# Patient Record
Sex: FEMALE | Race: WHITE | Employment: FULL TIME | URBAN - METROPOLITAN AREA
[De-identification: names, ages, dates, MRNs, and addresses within clinical notes are randomized per-mention and may not be internally consistent; named-entity substitution may affect disease eponyms.]

---

## 2017-07-01 ENCOUNTER — OFFICE VISIT (OUTPATIENT)
Dept: FAMILY MEDICINE CLINIC | Facility: CLINIC | Age: 22
End: 2017-07-01

## 2017-07-01 VITALS
TEMPERATURE: 98 F | SYSTOLIC BLOOD PRESSURE: 120 MMHG | WEIGHT: 192 LBS | HEIGHT: 66 IN | BODY MASS INDEX: 30.86 KG/M2 | DIASTOLIC BLOOD PRESSURE: 70 MMHG

## 2017-07-01 DIAGNOSIS — R30.0 DYSURIA: Primary | ICD-10-CM

## 2017-07-01 LAB
APPEARANCE: CLEAR
MULTISTIX LOT#: ABNORMAL NUMERIC
PH, URINE: 7 (ref 4.5–8)
PROTEIN (URINE DIPSTICK): 100 MG/DL
SPECIFIC GRAVITY: >=1.03 (ref 1–1.03)
URINE-COLOR: YELLOW
UROBILINOGEN,SEMI-QN: 0.2 MG/DL (ref 0–1.9)

## 2017-07-01 PROCEDURE — 99203 OFFICE O/P NEW LOW 30 MIN: CPT | Performed by: PHYSICIAN ASSISTANT

## 2017-07-01 PROCEDURE — 87086 URINE CULTURE/COLONY COUNT: CPT | Performed by: PHYSICIAN ASSISTANT

## 2017-07-01 PROCEDURE — 81003 URINALYSIS AUTO W/O SCOPE: CPT | Performed by: PHYSICIAN ASSISTANT

## 2017-07-01 RX ORDER — NITROFURANTOIN 25; 75 MG/1; MG/1
100 CAPSULE ORAL 2 TIMES DAILY
Qty: 10 CAPSULE | Refills: 0 | Status: SHIPPED | OUTPATIENT
Start: 2017-07-01 | End: 2017-07-06

## 2017-07-01 NOTE — PROGRESS NOTES
CHIEF COMPLAINT:   Patient presents with:  Dysuria: x 4 days, LMP x 1 day. HPI:   Gilmar Castellanos is a 22year old female who presents with symptoms of UTI. Complaining of urinary frequency, urgency, dysuria for last 4 days.  Symptoms have been w APPEARANCE clear Clear   URINE-COLOR yellow Yellow   Multistix Lot# [id number] Numeric   Multistix Expiration Date 02/28/18 Date         ASSESSMENT AND PLAN:   Robynn Peabody is a 22year old female presents with UTI symptoms.     ASSESSMENT:  Dysuria  (pr Soaps, lotions, colognes and feminine hygiene products can cause dysuria. So can birth control jellies, creams, and foams. It will go away 1 to 3 days after using these irritants.   Sexually transmitted diseases (STDs) such as chlamydia or gonorrhea can cau Follow up with your healthcare provider, or as advised. If a culture was taken, you may call as directed for the results. If you have an STD, follow up with your provider or the public health department for a complete STD screening, including HIV testing.

## 2017-07-01 NOTE — PATIENT INSTRUCTIONS
1. Urinalysis today with trace blood, otherwise normal.  Culture pending, we will contact you with results in 2-3 days. 2.  Macrobid 100 mg twice daily for 5 days. 3.  Encourage clear fluids, avoid caffeine, vaginal hygiene reviewed.        Dysuria wit · If a culture was taken, don't have sex until you have been told that it is negative. This means you don't have an infection. Then follow your healthcare provider's advice to treat your condition.   If a culture was done and it is positive:  · Both you and

## 2017-07-04 ENCOUNTER — TELEPHONE (OUTPATIENT)
Dept: FAMILY MEDICINE CLINIC | Facility: CLINIC | Age: 22
End: 2017-07-04

## 2018-11-24 ENCOUNTER — OFFICE VISIT (OUTPATIENT)
Dept: FAMILY MEDICINE CLINIC | Facility: CLINIC | Age: 23
End: 2018-11-24
Payer: COMMERCIAL

## 2018-11-24 VITALS
TEMPERATURE: 99 F | HEIGHT: 67 IN | BODY MASS INDEX: 24.33 KG/M2 | SYSTOLIC BLOOD PRESSURE: 118 MMHG | RESPIRATION RATE: 16 BRPM | OXYGEN SATURATION: 99 % | DIASTOLIC BLOOD PRESSURE: 68 MMHG | HEART RATE: 78 BPM | WEIGHT: 155 LBS

## 2018-11-24 DIAGNOSIS — J02.9 SORE THROAT: ICD-10-CM

## 2018-11-24 DIAGNOSIS — R35.0 URINARY FREQUENCY: Primary | ICD-10-CM

## 2018-11-24 DIAGNOSIS — J03.90 TONSILLITIS: ICD-10-CM

## 2018-11-24 PROCEDURE — 99213 OFFICE O/P EST LOW 20 MIN: CPT | Performed by: NURSE PRACTITIONER

## 2018-11-24 PROCEDURE — 87880 STREP A ASSAY W/OPTIC: CPT | Performed by: NURSE PRACTITIONER

## 2018-11-24 PROCEDURE — 87086 URINE CULTURE/COLONY COUNT: CPT | Performed by: NURSE PRACTITIONER

## 2018-11-24 PROCEDURE — 81003 URINALYSIS AUTO W/O SCOPE: CPT | Performed by: NURSE PRACTITIONER

## 2018-11-24 RX ORDER — AMOXICILLIN 875 MG/1
875 TABLET, COATED ORAL 2 TIMES DAILY
Qty: 20 TABLET | Refills: 0 | Status: SHIPPED | OUTPATIENT
Start: 2018-11-24 | End: 2018-12-04

## 2018-11-24 NOTE — PROGRESS NOTES
CHIEF COMPLAINT:   Patient presents with:  Sore Throat: with fever x3 days  Urinary Frequency: x1 week (treated a week ago, still symptomatic)        HPI:   Gilmar Castellanos is a 26year old female presents to clinic with complaint of sore throat.  Kerri pharynx is very erythematous and injected. many exudates. Tonsils 3++/4. NECK: supple  LUNGS: clear to auscultation bilaterally, no wheezes or rhonchi. Breathing is non labored.   CARDIO: RRR without murmur  GI: good BS's,no masses, hepatosplenomegaly, MonoSpot or further lab work. Comfort measures explained and discussed as listed in Patient Instructions    Follow up with PCP in 3-5 days if not improving, condition worsens, or fever greater than or equal to 100.4 persists for 72 hours.     The manojen

## 2018-11-24 NOTE — PATIENT INSTRUCTIONS
Start Amoxicillin for the tonsils  Will check urine culture and treat if needed  Check out information in Interstitial Cystitis       What is Interstitial Cystitis? Cross section of bladder showing normal lining.      Interstitial cystitis is a painful combination of treatments for you. Medicine options include:  · Pain medicine. These may be used for a short time to help ease discomfort. · Antispasmodic medicines. These may help relax the bladder muscles. This may decrease the need to urinate.   · Nons professional's instructions.

## 2019-02-19 ENCOUNTER — OFFICE VISIT (OUTPATIENT)
Dept: FAMILY MEDICINE CLINIC | Facility: CLINIC | Age: 24
End: 2019-02-19
Payer: COMMERCIAL

## 2019-02-19 VITALS
HEART RATE: 83 BPM | RESPIRATION RATE: 18 BRPM | TEMPERATURE: 98 F | DIASTOLIC BLOOD PRESSURE: 64 MMHG | OXYGEN SATURATION: 100 % | HEIGHT: 67 IN | WEIGHT: 158 LBS | BODY MASS INDEX: 24.8 KG/M2 | SYSTOLIC BLOOD PRESSURE: 100 MMHG

## 2019-02-19 DIAGNOSIS — R31.9 HEMATURIA, UNSPECIFIED TYPE: ICD-10-CM

## 2019-02-19 DIAGNOSIS — N92.6 IRREGULAR MENSES: ICD-10-CM

## 2019-02-19 DIAGNOSIS — R39.9 UTI SYMPTOMS: Primary | ICD-10-CM

## 2019-02-19 LAB
APPEARANCE: CLEAR
CONTROL LINE PRESENT WITH A CLEAR BACKGROUND (YES/NO): YES YES/NO
MULTISTIX LOT#: ABNORMAL NUMERIC
PH, URINE: 6.5 (ref 4.5–8)
PREGNANCY TEST, URINE: NEGATIVE
SPECIFIC GRAVITY: 1.01 (ref 1–1.03)
UROBILINOGEN,SEMI-QN: 0.2 MG/DL (ref 0–1.9)

## 2019-02-19 PROCEDURE — 99213 OFFICE O/P EST LOW 20 MIN: CPT | Performed by: PHYSICIAN ASSISTANT

## 2019-02-19 PROCEDURE — 81003 URINALYSIS AUTO W/O SCOPE: CPT | Performed by: PHYSICIAN ASSISTANT

## 2019-02-19 PROCEDURE — 81025 URINE PREGNANCY TEST: CPT | Performed by: PHYSICIAN ASSISTANT

## 2019-02-19 PROCEDURE — 87147 CULTURE TYPE IMMUNOLOGIC: CPT | Performed by: PHYSICIAN ASSISTANT

## 2019-02-19 PROCEDURE — 87086 URINE CULTURE/COLONY COUNT: CPT | Performed by: PHYSICIAN ASSISTANT

## 2019-02-19 NOTE — PROGRESS NOTES
CHIEF COMPLAINT:   Patient presents with:  Urinary Symptoms (urologic): pressure when urinating/increase in frquency x 4-5 days. no fever      HPI:   Nicki Guevara is a 26year old female who presents with symptoms of UTI.  Complaining of urinary maryann Leukocyte Esterase Urine neg Negative    APPEARANCE clear Clear    Color Urine light yellow Yellow    Multistix Lot# [id number] Numeric    Multistix Expiration Date 8/31/20 Date   URINE PREGNANCY TEST    Collection Time: 02/19/19  3:56 PM   Result Value Ref The urethra is the tube that allows urine to pass out of the body. In a woman, the urethra is the opening above the vagina. In men, the urethra is the opening on the tip of the penis.  Dysuria is the feeling of pain or burning in the urethra when passing ur · Contact your healthcare provider or go to an urgent care clinic or the public health department to be looked at and treated.   · Don't have sex until both you and your partner(s) have finished all antibiotics and your healthcare provider says you are no l

## 2019-02-19 NOTE — PATIENT INSTRUCTIONS
1. Urinalysis with small amount of blood, urine culture pending. 2.  Urine pregnancy test negative. 3.  Further treatment pending results of your urine culture.   Follow up with PCP to establish care and further evaluation given history of irregular cy This means you don't have an infection. Then follow your healthcare provider's advice to treat your condition. If a culture was done and it is positive:  · Both you and your sexual partner may need to be treated.  This is true even if your partner has no s

## 2019-02-21 RX ORDER — CEPHALEXIN 500 MG/1
500 CAPSULE ORAL 2 TIMES DAILY
Qty: 20 CAPSULE | Refills: 0 | Status: SHIPPED | OUTPATIENT
Start: 2019-02-21 | End: 2019-03-03

## 2019-04-01 ENCOUNTER — OFFICE VISIT (OUTPATIENT)
Dept: FAMILY MEDICINE CLINIC | Facility: CLINIC | Age: 24
End: 2019-04-01
Payer: COMMERCIAL

## 2019-04-01 VITALS
HEART RATE: 98 BPM | TEMPERATURE: 99 F | OXYGEN SATURATION: 98 % | DIASTOLIC BLOOD PRESSURE: 72 MMHG | SYSTOLIC BLOOD PRESSURE: 120 MMHG | RESPIRATION RATE: 18 BRPM

## 2019-04-01 DIAGNOSIS — J03.90 EXUDATIVE TONSILLITIS: ICD-10-CM

## 2019-04-01 DIAGNOSIS — J02.9 SORE THROAT: ICD-10-CM

## 2019-04-01 PROCEDURE — 87880 STREP A ASSAY W/OPTIC: CPT | Performed by: PHYSICIAN ASSISTANT

## 2019-04-01 PROCEDURE — 99213 OFFICE O/P EST LOW 20 MIN: CPT | Performed by: PHYSICIAN ASSISTANT

## 2019-04-01 PROCEDURE — 87081 CULTURE SCREEN ONLY: CPT | Performed by: PHYSICIAN ASSISTANT

## 2019-04-01 RX ORDER — AMOXICILLIN 500 MG/1
500 CAPSULE ORAL 2 TIMES DAILY
Qty: 20 CAPSULE | Refills: 0 | Status: SHIPPED | OUTPATIENT
Start: 2019-04-01 | End: 2019-04-11

## 2019-04-01 NOTE — PATIENT INSTRUCTIONS
1.  Amoxicillin 500 mg twice daily for 10 days. 2.  Throat culture pending, we will contact you in 2-3 days with results. · You are considered to be contagious until you have been on antibiotics for 24 hours.    · You can return to school and/or work reinfection or antibiotic resistance

## 2019-04-01 NOTE — PROGRESS NOTES
CHIEF COMPLAINT:   Patient presents with:  Sore Throat: x 1 day         HPI:   Gilmar Castellanos is a 26year old female presents to clinic with complaint of sore throat. Patient has had 1 days. Symptoms have been worsening since onset.   Patient reports non labored. CARDIO: RRR without murmur  EXTREMITIES  PEARSON, no c/c/e. ASSESSMENT AND PLAN:   Assessment:   Sore throat  (primary encounter diagnosis)  Exudative tonsillitis    Plan:  1. Rapid strep negative, exam findigns/HPI c/w strep.   Empiric tx have been on antibiotics for 2 days to prevent re-exposure to germs causing illness. · Throat lozenges can sometimes provide temporary relief for a minor sore throat.  Various formulations exist, though they are not recommended for young children, due to

## 2019-04-05 ENCOUNTER — TELEPHONE (OUTPATIENT)
Dept: FAMILY MEDICINE CLINIC | Facility: CLINIC | Age: 24
End: 2019-04-05

## 2019-04-05 NOTE — TELEPHONE ENCOUNTER
Patient called back to discuss throat culture results. 4+ growth of non group A strep. Patient states her sore throat has resolved and is no longer symptomatic. Will not start antibiotic at this time. RTC prn.

## 2019-06-10 ENCOUNTER — OFFICE VISIT (OUTPATIENT)
Dept: FAMILY MEDICINE CLINIC | Facility: CLINIC | Age: 24
End: 2019-06-10
Payer: COMMERCIAL

## 2019-06-10 VITALS
RESPIRATION RATE: 16 BRPM | HEART RATE: 76 BPM | BODY MASS INDEX: 25 KG/M2 | WEIGHT: 158 LBS | DIASTOLIC BLOOD PRESSURE: 80 MMHG | SYSTOLIC BLOOD PRESSURE: 118 MMHG | TEMPERATURE: 98 F | OXYGEN SATURATION: 98 %

## 2019-06-10 DIAGNOSIS — R39.9 UTI SYMPTOMS: Primary | ICD-10-CM

## 2019-06-10 PROCEDURE — 81003 URINALYSIS AUTO W/O SCOPE: CPT | Performed by: NURSE PRACTITIONER

## 2019-06-10 PROCEDURE — 99213 OFFICE O/P EST LOW 20 MIN: CPT | Performed by: NURSE PRACTITIONER

## 2019-06-10 PROCEDURE — 87086 URINE CULTURE/COLONY COUNT: CPT | Performed by: NURSE PRACTITIONER

## 2019-06-10 PROCEDURE — 87088 URINE BACTERIA CULTURE: CPT | Performed by: NURSE PRACTITIONER

## 2019-06-10 PROCEDURE — 87186 SC STD MICRODIL/AGAR DIL: CPT | Performed by: NURSE PRACTITIONER

## 2019-06-10 RX ORDER — NITROFURANTOIN 25; 75 MG/1; MG/1
100 CAPSULE ORAL 2 TIMES DAILY
Qty: 14 CAPSULE | Refills: 0 | Status: SHIPPED | OUTPATIENT
Start: 2019-06-10 | End: 2019-06-17

## 2019-06-10 NOTE — PROGRESS NOTES
CHIEF COMPLAINT:   Patient presents with:  Urinary Symptoms (urologic): x 2 days. increase in frquency/pain while urinating.  no fever      HPI:   Nicki Guevara is a 26year old female who presents with symptoms of burning with urination, frequency Blood Urine trace Negative    PH Urine 6.0 4.5 - 8.0    Protein Urine negative Negative/Trace mg/dL    Urobilinogen Urine 0.2 0.0 - 1.9 mg/dL    Nitrite Urine negative Negative    Leukocyte Esterase Urine trace Negative    APPEARANCE cloudy Clear    Color The most common place for an infection is in the bladder. This is called a bladder infection. This is one of the most common infections in women. Most bladder infections are easily treated. They are not serious unless the infection spreads to the kidney. Bladder infections are diagnosed by a urine test. They are treated with antibiotics and usually clear up quickly without complications. Treatment helps prevent a more serious kidney infection.   Medicines  Medicines can help in the treatment of a bladder in Call your healthcare provider if all symptoms are not gone after 3 days of treatment. This is especially important if you have repeat infections. If a culture was done, you will be told if your treatment needs to be changed.  If directed, you can call to f

## 2019-09-04 ENCOUNTER — LAB ENCOUNTER (OUTPATIENT)
Dept: LAB | Age: 24
End: 2019-09-04
Attending: Other
Payer: COMMERCIAL

## 2019-09-04 DIAGNOSIS — F32.A FATIGUE DUE TO DEPRESSION: ICD-10-CM

## 2019-09-04 DIAGNOSIS — R53.83 FATIGUE DUE TO DEPRESSION: ICD-10-CM

## 2019-09-04 LAB
ALBUMIN SERPL-MCNC: 4.5 G/DL (ref 3.4–5)
ALBUMIN/GLOB SERPL: 1.3 {RATIO} (ref 1–2)
ALP LIVER SERPL-CCNC: 66 U/L (ref 52–144)
ALT SERPL-CCNC: 39 U/L (ref 13–56)
ANION GAP SERPL CALC-SCNC: 7 MMOL/L (ref 0–18)
AST SERPL-CCNC: 21 U/L (ref 15–37)
BASOPHILS # BLD AUTO: 0.03 X10(3) UL (ref 0–0.2)
BASOPHILS NFR BLD AUTO: 0.4 %
BILIRUB SERPL-MCNC: 0.9 MG/DL (ref 0.1–2)
BUN BLD-MCNC: 12 MG/DL (ref 7–18)
BUN/CREAT SERPL: 13.3 (ref 10–20)
CALCIUM BLD-MCNC: 9 MG/DL (ref 8.5–10.1)
CHLORIDE SERPL-SCNC: 103 MMOL/L (ref 98–112)
CO2 SERPL-SCNC: 28 MMOL/L (ref 21–32)
CREAT BLD-MCNC: 0.9 MG/DL (ref 0.55–1.02)
DEPRECATED RDW RBC AUTO: 43.1 FL (ref 35.1–46.3)
EOSINOPHIL # BLD AUTO: 0.13 X10(3) UL (ref 0–0.7)
EOSINOPHIL NFR BLD AUTO: 1.6 %
ERYTHROCYTE [DISTWIDTH] IN BLOOD BY AUTOMATED COUNT: 11.8 % (ref 11–15)
GLOBULIN PLAS-MCNC: 3.5 G/DL (ref 2.8–4.4)
GLUCOSE BLD-MCNC: 89 MG/DL (ref 70–99)
HCT VFR BLD AUTO: 43.3 % (ref 35–48)
HGB BLD-MCNC: 14.8 G/DL (ref 12–16)
IMM GRANULOCYTES # BLD AUTO: 0.03 X10(3) UL (ref 0–1)
IMM GRANULOCYTES NFR BLD: 0.4 %
LYMPHOCYTES # BLD AUTO: 2.4 X10(3) UL (ref 1–4)
LYMPHOCYTES NFR BLD AUTO: 29 %
M PROTEIN MFR SERPL ELPH: 8 G/DL (ref 6.4–8.2)
MCH RBC QN AUTO: 33.5 PG (ref 26–34)
MCHC RBC AUTO-ENTMCNC: 34.2 G/DL (ref 31–37)
MCV RBC AUTO: 98 FL (ref 80–100)
MONOCYTES # BLD AUTO: 0.51 X10(3) UL (ref 0.1–1)
MONOCYTES NFR BLD AUTO: 6.2 %
NEUTROPHILS # BLD AUTO: 5.17 X10 (3) UL (ref 1.5–7.7)
NEUTROPHILS # BLD AUTO: 5.17 X10(3) UL (ref 1.5–7.7)
NEUTROPHILS NFR BLD AUTO: 62.4 %
OSMOLALITY SERPL CALC.SUM OF ELEC: 285 MOSM/KG (ref 275–295)
PLATELET # BLD AUTO: 265 10(3)UL (ref 150–450)
POTASSIUM SERPL-SCNC: 4.2 MMOL/L (ref 3.5–5.1)
RBC # BLD AUTO: 4.42 X10(6)UL (ref 3.8–5.3)
SODIUM SERPL-SCNC: 138 MMOL/L (ref 136–145)
T4 FREE SERPL-MCNC: 1 NG/DL (ref 0.8–1.7)
TSI SER-ACNC: 1.12 MIU/ML (ref 0.36–3.74)
WBC # BLD AUTO: 8.3 X10(3) UL (ref 4–11)

## 2019-09-04 PROCEDURE — 36415 COLL VENOUS BLD VENIPUNCTURE: CPT

## 2019-09-04 PROCEDURE — 80053 COMPREHEN METABOLIC PANEL: CPT

## 2019-09-04 PROCEDURE — 84443 ASSAY THYROID STIM HORMONE: CPT

## 2019-09-04 PROCEDURE — 84439 ASSAY OF FREE THYROXINE: CPT

## 2019-09-04 PROCEDURE — 85025 COMPLETE CBC W/AUTO DIFF WBC: CPT

## 2019-09-07 PROBLEM — F10.20 ALCOHOL USE DISORDER, SEVERE, DEPENDENCE (HCC): Status: ACTIVE | Noted: 2019-09-07

## 2019-09-07 PROBLEM — F33.2 SEVERE EPISODE OF RECURRENT MAJOR DEPRESSIVE DISORDER, WITHOUT PSYCHOTIC FEATURES (HCC): Status: ACTIVE | Noted: 2019-09-07

## 2019-10-11 PROBLEM — F12.20 CANNABIS USE DISORDER, MODERATE, DEPENDENCE (HCC): Status: ACTIVE | Noted: 2019-10-11

## 2019-10-11 PROBLEM — F41.9 ANXIETY DISORDER: Status: ACTIVE | Noted: 2019-10-11

## 2019-10-11 PROBLEM — F39 MOOD DISORDER (HCC): Status: ACTIVE | Noted: 2019-10-11

## 2019-10-11 PROBLEM — F14.10 COCAINE USE DISORDER (HCC): Status: ACTIVE | Noted: 2019-10-11

## 2020-03-04 PROBLEM — F10.21 ALCOHOL USE DISORDER, SEVERE, IN EARLY REMISSION (HCC): Status: ACTIVE | Noted: 2020-03-04

## 2024-01-07 ENCOUNTER — OFFICE VISIT (OUTPATIENT)
Dept: FAMILY MEDICINE CLINIC | Facility: CLINIC | Age: 29
End: 2024-01-07
Payer: MEDICAID

## 2024-01-07 VITALS
HEART RATE: 84 BPM | DIASTOLIC BLOOD PRESSURE: 68 MMHG | TEMPERATURE: 97 F | WEIGHT: 175 LBS | HEIGHT: 66 IN | RESPIRATION RATE: 18 BRPM | BODY MASS INDEX: 28.13 KG/M2 | OXYGEN SATURATION: 98 % | SYSTOLIC BLOOD PRESSURE: 112 MMHG

## 2024-01-07 DIAGNOSIS — R50.9 FEVER, UNSPECIFIED FEVER CAUSE: Primary | ICD-10-CM

## 2024-01-07 PROCEDURE — 99203 OFFICE O/P NEW LOW 30 MIN: CPT | Performed by: FAMILY MEDICINE

## 2024-01-07 PROCEDURE — 3074F SYST BP LT 130 MM HG: CPT | Performed by: FAMILY MEDICINE

## 2024-01-07 PROCEDURE — 3078F DIAST BP <80 MM HG: CPT | Performed by: FAMILY MEDICINE

## 2024-01-07 PROCEDURE — 3008F BODY MASS INDEX DOCD: CPT | Performed by: FAMILY MEDICINE

## 2024-01-07 RX ORDER — BENZONATATE 200 MG/1
200 CAPSULE ORAL 3 TIMES DAILY PRN
Qty: 20 CAPSULE | Refills: 0 | Status: SHIPPED | OUTPATIENT
Start: 2024-01-07

## 2024-01-07 NOTE — PROGRESS NOTES
Mikaela Go is a 28 year old female.    S:  Patient presents today with the following concerns:  Fever (Sinus- nasal congestion  Cough  Started Friday highest 101.3 )  No sore throat.  No N/V/D.  No dyspnea.  No recent travel.    Friend was sick recently.  101.3.  temp was today.  Biggest complaint is her sinus pressure.  Last covid infection was few years ago.      Current Outpatient Medications   Medication Sig Dispense Refill    benzonatate 200 MG Oral Cap Take 1 capsule (200 mg total) by mouth 3 (three) times daily as needed. 20 capsule 0    valACYclovir HCl 1 G Oral Tab Take by mouth once daily.  2     Patient Active Problem List   Diagnosis    Severe episode of recurrent major depressive disorder, without psychotic features (HCC)    Alcohol use disorder, severe, dependence (HCC)    Mood disorder (HCC)    Anxiety disorder    Cannabis use disorder, moderate, dependence (HCC)    Cocaine use disorder (HCC)    Alcohol use disorder, severe, in early remission (HCC)     Family History   Problem Relation Age of Onset    Substance Abuse Father          of heart attack by cocaine OD    Depression Father     Alcohol and Other Disorders Associated Father     Depression Mother     Anxiety Mother     Substance Abuse Mother     Depression Maternal Grandmother     Anxiety Maternal Grandmother     Diabetes Maternal Grandfather     Diabetes Paternal Grandmother     Depression Paternal Grandmother     Alcohol and Other Disorders Associated Paternal Grandfather     Alcohol and Other Disorders Associated Other         dad's side       REVIEW OF SYSTEMS:  GENERAL: feels unwell.  SKIN: denies any unusual skin lesions  EYES:denies vision change  LUNGS: denies shortness of breath with exertion  CARDIOVASCULAR: denies chest pain on exertion  GI: denies abdominal pain.  No N/V/D/C  : denies dysuria  MUSCULOSKELETAL: denies back pain  NEURO:  headaches    EXAM:  /68   Pulse 84   Temp 97.3 °F (36.3 °C)   Resp 18    Ht 5' 6\" (1.676 m)   Wt 175 lb (79.4 kg)   LMP 01/05/2024 (Exact Date)   SpO2 98%   BMI 28.25 kg/m²   GENERAL: well developed, well nourished,in no apparent distress.  Mood, affect, and behavior are normal.  SKIN: no rashes,no suspicious lesions  HEENT: atraumatic, normocephalic,ears and throat are clear  Nasal turbinates pale and boggy.  EYES:PERRLA, EOMI  NECK: supple,no adenopathy  LUNGS: CTA, no RRW  CARDIO: RRR without murmur  EXTREMITIES: no edema  NEURO: Oriented times three,cranial nerves are intact,motor and sensory are grossly intact    ASSESSMENT AND PLAN:  Mikaela Go is a 28 year old female.  Encounter Diagnosis   Name Primary?    Fever, unspecified fever cause Yes       Orders Placed This Encounter   Procedures    SARS-CoV-2/Flu A and B/RSV by PCR ([facility]) [E]     Meds & Refills for this Visit:  Requested Prescriptions     Signed Prescriptions Disp Refills    benzonatate 200 MG Oral Cap 20 capsule 0     Sig: Take 1 capsule (200 mg total) by mouth 3 (three) times daily as needed.     Imaging & Consults:  None    Benzonatate tid prn cough.    Discussed with her that this is likely viral.  Alinity quad sent out.    Recommend Mucinex for sinus pressure.  Restart Flonase nasal spray.  Fluids, vaporizer, rest.    Discussed isolation and masking guidelines if Covid +.  We are seeing a lot of sinus pressure with Covid infections right now.    Patient works from home.    Go to ED with dyspnea or uncontrolled fevers.    Follow up if symptoms change, worsen, do not improve.    Patient verbalizes understanding of plan.

## 2024-01-08 LAB
FLUAV + FLUBV RNA SPEC NAA+PROBE: DETECTED
FLUAV + FLUBV RNA SPEC NAA+PROBE: NOT DETECTED
RSV RNA SPEC NAA+PROBE: NOT DETECTED
SARS-COV-2 RNA RESP QL NAA+PROBE: NOT DETECTED

## 2024-05-11 ENCOUNTER — OFFICE VISIT (OUTPATIENT)
Dept: FAMILY MEDICINE CLINIC | Facility: CLINIC | Age: 29
End: 2024-05-11
Payer: MEDICAID

## 2024-05-11 VITALS
RESPIRATION RATE: 16 BRPM | TEMPERATURE: 98 F | WEIGHT: 170 LBS | HEIGHT: 67 IN | DIASTOLIC BLOOD PRESSURE: 78 MMHG | BODY MASS INDEX: 26.68 KG/M2 | HEART RATE: 78 BPM | OXYGEN SATURATION: 100 % | SYSTOLIC BLOOD PRESSURE: 116 MMHG

## 2024-05-11 DIAGNOSIS — J02.0 STREP THROAT: Primary | ICD-10-CM

## 2024-05-11 DIAGNOSIS — J02.9 SORE THROAT: ICD-10-CM

## 2024-05-11 LAB
CONTROL LINE PRESENT WITH A CLEAR BACKGROUND (YES/NO): YES YES/NO
KIT LOT #: ABNORMAL NUMERIC

## 2024-05-11 PROCEDURE — 87880 STREP A ASSAY W/OPTIC: CPT | Performed by: NURSE PRACTITIONER

## 2024-05-11 PROCEDURE — 99213 OFFICE O/P EST LOW 20 MIN: CPT | Performed by: NURSE PRACTITIONER

## 2024-05-11 RX ORDER — AZITHROMYCIN 250 MG/1
TABLET, FILM COATED ORAL
Qty: 6 TABLET | Refills: 0 | Status: SHIPPED | OUTPATIENT
Start: 2024-05-11 | End: 2024-05-16

## 2024-05-11 NOTE — PROGRESS NOTES
CHIEF COMPLAINT:     Chief Complaint   Patient presents with    Sore Throat     3 days, Sore throat  OTC Advil       HPI:   Mikaela Go is a 28 year old female presents to clinic with symptoms of sore throat. Patient has had for 3 days. Symptoms have been worsening since onset.  Patient reports following associated symptoms: sore throat. Patient denies headache. Patient denies stomach upset. Patient denies rash.  Patient reports history of strep. Patient denies strep pharyngitis exposure.  Treating symptoms with: ibuprofen.      Current Outpatient Medications   Medication Sig Dispense Refill    azithromycin 250 MG Oral Tab Take 2 tablets (500 mg total) by mouth daily for 1 day, THEN 1 tablet (250 mg total) daily for 4 days. 6 tablet 0    benzonatate 200 MG Oral Cap Take 1 capsule (200 mg total) by mouth 3 (three) times daily as needed. 20 capsule 0    valACYclovir HCl 1 G Oral Tab Take by mouth once daily.  2      History reviewed. No pertinent past medical history.   Social History:  Social History     Socioeconomic History    Marital status: Single   Tobacco Use    Smoking status: Every Day    Smokeless tobacco: Never   Vaping Use    Vaping status: Every Day   Substance and Sexual Activity    Alcohol use: Yes     Alcohol/week: 7.0 standard drinks of alcohol     Types: 6 Cans of beer, 1 Shots of liquor per week     Comment: \"I'm getting hammered every single time.\" 6+ beers, last use 8/27/19    Drug use: Yes     Types: benzodiazepines, Cannabis, Cocaine     Comment: last xanax use 5/2019, last cannabis use 8/28/19     Social Determinants of Health      [facility]    Social Connections    [facility]    Housing Stability        REVIEW OF SYSTEMS:   GENERAL HEALTH:  See HPI  SKIN: see HPI  HEENT: denies ear pain, See HPI  RESPIRATORY: denies shortness of breath, or  wheezing  CARDIOVASCULAR: denies chest pain, palpitations   GI: denies abdominal pain, constipation and diarrhea  NEURO: denies dizziness or lightheadedness    EXAM:   /78   Pulse 78   Temp 97.6 °F (36.4 °C)   Resp 16   Ht 5' 7\" (1.702 m)   Wt 170 lb (77.1 kg)   LMP 05/04/2024 (Approximate)   SpO2 100%   BMI 26.63 kg/m²   GENERAL: well developed, well nourished,in no apparent distress  SKIN: no rashes,no suspicious lesions  HEAD: atraumatic, normocephalic  EYES: conjunctiva clear, EOM intact  EARS: TM's clear, non-injected, no bulging, retraction, or fluid  NOSE: nostrils patent, no exudates, nasal mucosa pink and noninflamed  THROAT: oral mucosa pink, moist. Posterior pharynx erythematous and injected. no exudates. Tonsils 3/4.  Breath not malodorous. No uvular deviation. No drooling.  NECK: supple  LUNGS: clear to auscultation bilaterally, no wheezes or rhonchi. Breathing is non labored.  CARDIO: RRR without murmur  GI: good BS's,no masses, hepatosplenomegaly, or tenderness on direct palpation  EXTREMITIES: no cyanosis or edema  LYMPH: pos anterior cervical. no submandibular lymphadenopathy.  No posterior cervical or occipital lymphadenopathy.    Recent Results (from the past 24 hour(s))   Strep A Assay W/Optic    Collection Time: 05/11/24 12:27 PM   Result Value Ref Range    Strep Grp A Screen pos Negative    Control Line Present with a clear background (yes/no) yes Yes/No    Kit Lot # [id number] Numeric    Kit Expiration Date 5/21/25 Date         ASSESSMENT AND PLAN:   Assessment:   Encounter Diagnoses   Name Primary?    Sore throat     Strep throat Yes         Plan:  Comfort Measures discussed and listed in Patient Instructions. Prescription: as below.     Requested Prescriptions     Signed Prescriptions Disp Refills    azithromycin 250 MG Oral Tab 6 tablet 0     Sig: Take 2 tablets (500 mg total) by mouth daily for 1 day, THEN 1 tablet (250 mg total) daily for 4 days.       Risks, benefits,  complications and side effects of meds discussed with patient.     OTC Tylenol/Motrin prn.   Push fluids- warm or cool liquids, whichever is soothing for patient  If treated with antibiotics, change tooth brush after on medication for 48 hours.   Warm salt water gargles 2 times per day for at least 3 days.    Do not share utensils or drinks with anyone.      Follow up with PCP if not improving, condition worsens, or fever greater than or equal to 100.4 persists for 72 hours.      The patient/parent indicates understanding of these issues and agrees to the plan.  The patient is asked to follow up with their PCP prn.

## 2024-07-31 ENCOUNTER — APPOINTMENT (OUTPATIENT)
Dept: DERMATOLOGY | Age: 29
End: 2024-07-31

## 2024-07-31 DIAGNOSIS — D23.9 DERMATOFIBROMA: Primary | ICD-10-CM

## 2024-08-26 ENCOUNTER — APPOINTMENT (OUTPATIENT)
Dept: DERMATOLOGY | Age: 29
End: 2024-08-26

## 2024-09-06 ENCOUNTER — OFFICE VISIT (OUTPATIENT)
Dept: FAMILY MEDICINE CLINIC | Facility: CLINIC | Age: 29
End: 2024-09-06
Payer: MEDICAID

## 2024-09-06 DIAGNOSIS — Z53.21 PATIENT LEFT WITHOUT BEING SEEN: Primary | ICD-10-CM

## 2024-09-06 NOTE — PROGRESS NOTES
Patient opted to not be seen as could not get tetanus vaccination here due to Medicaid insurance.  (cut finger on knife 4 days ago).  Encouraged her to obtain vaccination elsewhere as no evidence of recent vaccination in her chart..

## 2024-11-13 ENCOUNTER — OFFICE VISIT (OUTPATIENT)
Dept: FAMILY MEDICINE CLINIC | Facility: CLINIC | Age: 29
End: 2024-11-13
Payer: MEDICAID

## 2024-11-13 VITALS
DIASTOLIC BLOOD PRESSURE: 70 MMHG | WEIGHT: 175 LBS | SYSTOLIC BLOOD PRESSURE: 110 MMHG | OXYGEN SATURATION: 99 % | TEMPERATURE: 98 F | HEIGHT: 67 IN | HEART RATE: 96 BPM | BODY MASS INDEX: 27.47 KG/M2 | RESPIRATION RATE: 16 BRPM

## 2024-11-13 DIAGNOSIS — J02.0 STREP THROAT: Primary | ICD-10-CM

## 2024-11-13 DIAGNOSIS — J02.9 SORE THROAT: ICD-10-CM

## 2024-11-13 LAB
CONTROL LINE PRESENT WITH A CLEAR BACKGROUND (YES/NO): YES YES/NO
KIT LOT #: ABNORMAL NUMERIC

## 2024-11-13 PROCEDURE — 99213 OFFICE O/P EST LOW 20 MIN: CPT | Performed by: NURSE PRACTITIONER

## 2024-11-13 PROCEDURE — 87880 STREP A ASSAY W/OPTIC: CPT | Performed by: NURSE PRACTITIONER

## 2024-11-13 RX ORDER — AZITHROMYCIN 250 MG/1
TABLET, FILM COATED ORAL
Qty: 6 TABLET | Refills: 0 | Status: SHIPPED | OUTPATIENT
Start: 2024-11-13 | End: 2024-11-18

## 2024-11-13 NOTE — PROGRESS NOTES
CHIEF COMPLAINT:     Chief Complaint   Patient presents with    Sore Throat     X 1 day       HPI:   Mikaela Go is a 29 year old female presents to clinic with symptoms of sore throat. Patient has had for 1 days. Symptoms have been worsening since onset.  Patient reports following associated symptoms: sore throat, body aches. Patient denies headache. Patient denies stomach upset. Patient denies rash.  Patient reports history of strep. Patient denies strep pharyngitis exposure.  Treating symptoms with: advil      Current Outpatient Medications   Medication Sig Dispense Refill    benzonatate 200 MG Oral Cap Take 1 capsule (200 mg total) by mouth 3 (three) times daily as needed. 20 capsule 0    valACYclovir HCl 1 G Oral Tab Take by mouth once daily.  2      History reviewed. No pertinent past medical history.   Social History:  Social History     Socioeconomic History    Marital status: Single   Tobacco Use    Smoking status: Every Day    Smokeless tobacco: Never   Vaping Use    Vaping status: Every Day   Substance and Sexual Activity    Alcohol use: Yes     Alcohol/week: 7.0 standard drinks of alcohol     Types: 6 Cans of beer, 1 Shots of liquor per week     Comment: \"I'm getting hammered every single time.\" 6+ beers, last use 8/27/19    Drug use: Yes     Types: benzodiazepines, Cannabis, Cocaine     Comment: last xanax use 5/2019, last cannabis use 8/28/19     Social Drivers of Health      [facility]    Social Connections    [facility]    Housing Stability        REVIEW OF SYSTEMS:   GENERAL HEALTH:  See HPI  SKIN: see HPI  HEENT: denies ear pain, See HPI  RESPIRATORY: denies shortness of breath, or wheezing  CARDIOVASCULAR: denies chest pain, palpitations   GI: denies abdominal pain, constipation and diarrhea  NEURO: denies dizziness or lightheadedness    EXAM:   /70    Pulse 96   Temp 97.7 °F (36.5 °C)   Resp 16   Ht 5' 7\" (1.702 m)   Wt 175 lb (79.4 kg)   LMP 05/04/2024 (Approximate)   SpO2 99%   BMI 27.41 kg/m²   GENERAL: well developed, well nourished,in no apparent distress  SKIN: no rashes,no suspicious lesions  HEAD: atraumatic, normocephalic  EYES: conjunctiva clear, EOM intact  EARS: TM's clear, non-injected, no bulging, retraction, or fluid  NOSE: nostrils patent, no exudates, nasal mucosa pink and noninflamed  THROAT: oral mucosa pink, moist. Posterior pharynx erythematous and injected. pos exudates. Tonsils 3/4.  Breath not malodorous. No uvular deviation. No drooling.  NECK: supple  LUNGS: clear to auscultation bilaterally, no wheezes or rhonchi. Breathing is non labored.  CARDIO: RRR without murmur  GI: good BS's,no masses, hepatosplenomegaly, or tenderness on direct palpation  EXTREMITIES: no cyanosis or edema  LYMPH: pos anterior cervical. pos submandibular lymphadenopathy.  No posterior cervical or occipital lymphadenopathy.    No results found for this or any previous visit (from the past 24 hours).      ASSESSMENT AND PLAN:   Assessment:   Encounter Diagnoses   Name Primary?    Sore throat Yes    Strep throat          Plan:  Comfort Measures discussed and listed in Patient Instructions. Prescription: as below.     Requested Prescriptions      No prescriptions requested or ordered in this encounter       Risks, benefits, complications and side effects of meds discussed with patient.     OTC Tylenol/Motrin prn.   Push fluids- warm or cool liquids, whichever is soothing for patient  If treated with antibiotics, change tooth brush after on medication for 48 hours.   Warm salt water gargles 2 times per day for at least 3 days.    Do not share utensils or drinks with anyone.      Follow up with PCP if not improving, condition worsens, or fever greater than or equal to 100.4 persists for 72 hours.      The patient/parent indicates understanding of these issues  and agrees to the plan.  The patient is asked to follow up with their PCP prn.